# Patient Record
Sex: FEMALE | Race: WHITE | NOT HISPANIC OR LATINO | Employment: STUDENT | URBAN - NONMETROPOLITAN AREA
[De-identification: names, ages, dates, MRNs, and addresses within clinical notes are randomized per-mention and may not be internally consistent; named-entity substitution may affect disease eponyms.]

---
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
## 2024-01-11 ENCOUNTER — HOSPITAL ENCOUNTER (EMERGENCY)
Facility: HOSPITAL | Age: 19
Discharge: HOME | End: 2024-01-11
Attending: STUDENT IN AN ORGANIZED HEALTH CARE EDUCATION/TRAINING PROGRAM
Payer: COMMERCIAL

## 2024-01-11 VITALS
HEIGHT: 68 IN | RESPIRATION RATE: 20 BRPM | HEART RATE: 100 BPM | OXYGEN SATURATION: 98 % | WEIGHT: 210 LBS | BODY MASS INDEX: 31.83 KG/M2 | SYSTOLIC BLOOD PRESSURE: 139 MMHG | TEMPERATURE: 98.6 F | DIASTOLIC BLOOD PRESSURE: 86 MMHG

## 2024-01-11 DIAGNOSIS — H72.92 PERFORATION OF LEFT TYMPANIC MEMBRANE: Primary | ICD-10-CM

## 2024-01-11 LAB
FLUAV RNA RESP QL NAA+PROBE: NOT DETECTED
FLUBV RNA ISLT QL NAA+PROBE: NOT DETECTED
SARS-COV-2 RNA RESP QL NAA+PROBE: NOT DETECTED

## 2024-01-11 PROCEDURE — 25010000002 METHYLPREDNISOLONE PER 125 MG: Performed by: PHYSICIAN ASSISTANT

## 2024-01-11 PROCEDURE — 96372 THER/PROPH/DIAG INJ SC/IM: CPT

## 2024-01-11 PROCEDURE — 87636 SARSCOV2 & INF A&B AMP PRB: CPT | Performed by: PHYSICIAN ASSISTANT

## 2024-01-11 PROCEDURE — 99283 EMERGENCY DEPT VISIT LOW MDM: CPT

## 2024-01-11 PROCEDURE — 25010000002 CEFTRIAXONE PER 250 MG: Performed by: PHYSICIAN ASSISTANT

## 2024-01-11 RX ORDER — CIPROFLOXACIN AND DEXAMETHASONE 3; 1 MG/ML; MG/ML
4 SUSPENSION/ DROPS AURICULAR (OTIC) 2 TIMES DAILY
Status: DISCONTINUED | OUTPATIENT
Start: 2024-01-11 | End: 2024-01-11 | Stop reason: HOSPADM

## 2024-01-11 RX ORDER — METHYLPREDNISOLONE SODIUM SUCCINATE 125 MG/2ML
80 INJECTION, POWDER, LYOPHILIZED, FOR SOLUTION INTRAMUSCULAR; INTRAVENOUS ONCE
Status: COMPLETED | OUTPATIENT
Start: 2024-01-11 | End: 2024-01-11

## 2024-01-11 RX ORDER — HYDROCODONE BITARTRATE AND ACETAMINOPHEN 5; 325 MG/1; MG/1
1 TABLET ORAL ONCE
Status: COMPLETED | OUTPATIENT
Start: 2024-01-11 | End: 2024-01-11

## 2024-01-11 RX ORDER — CEFDINIR 300 MG/1
300 CAPSULE ORAL 2 TIMES DAILY
Qty: 10 CAPSULE | Refills: 0 | Status: SHIPPED | OUTPATIENT
Start: 2024-01-11

## 2024-01-11 RX ORDER — DIPHENHYDRAMINE HCL 25 MG
25 TABLET ORAL EVERY 6 HOURS PRN
Qty: 30 TABLET | Refills: 0 | Status: SHIPPED | OUTPATIENT
Start: 2024-01-11

## 2024-01-11 RX ORDER — METHYLPREDNISOLONE 4 MG/1
TABLET ORAL
Qty: 21 TABLET | Refills: 0 | Status: SHIPPED | OUTPATIENT
Start: 2024-01-11

## 2024-01-11 RX ADMIN — LIDOCAINE HYDROCHLORIDE 1 G: 10 INJECTION, SOLUTION EPIDURAL; INFILTRATION; INTRACAUDAL; PERINEURAL at 18:30

## 2024-01-11 RX ADMIN — HYDROCODONE BITARTRATE AND ACETAMINOPHEN 1 TABLET: 5; 325 TABLET ORAL at 18:30

## 2024-01-11 RX ADMIN — METHYLPREDNISOLONE SODIUM SUCCINATE 80 MG: 125 INJECTION, POWDER, FOR SOLUTION INTRAMUSCULAR; INTRAVENOUS at 18:30

## 2024-01-11 RX ADMIN — CIPROFLOXACIN AND DEXAMETHASONE 4 DROP: 3; 1 SUSPENSION/ DROPS AURICULAR (OTIC) at 18:27

## 2024-01-11 NOTE — ED PROVIDER NOTES
Subjective   History of Present Illness  18-year-old female with past medical history of allergies presents to the emergency room accompanied by her mother for bilateral ear pain which is worse on the left than on the right has been ongoing for several days.  Mother states patient was seen by primary care physician on Monday and placed on azithromycin for her ear infection, however states that she is only getting worse and not getting any better.  Patient also complains of laryngitis, congestion, and decreased hearing out of her left ear.  She denies fevers, chills, or bodyaches.  Denies any specific aggravating or alleviating factors despite the above-mentioned.  Denies any other complaints or concerns at this time.    History provided by:  Patient   used: No        Review of Systems   Constitutional: Negative.  Negative for fever.   HENT:  Positive for congestion and ear pain.    Respiratory: Negative.     Cardiovascular: Negative.  Negative for chest pain.   Gastrointestinal: Negative.  Negative for abdominal pain.   Endocrine: Negative.    Genitourinary: Negative.  Negative for dysuria.   Skin: Negative.    Neurological: Negative.    Psychiatric/Behavioral: Negative.     All other systems reviewed and are negative.      Past Medical History:   Diagnosis Date    Seasonal allergies        Allergies   Allergen Reactions    Flonase [Fluticasone]      Migraine headache     Keflex [Cephalexin] Rash     ? PCN    Penicillins Rash       Past Surgical History:   Procedure Laterality Date    TONSILLECTOMY AND ADENOIDECTOMY         Family History   Problem Relation Age of Onset    No Known Problems Mother     No Known Problems Father     No Known Problems Brother     No Known Problems Maternal Grandmother     No Known Problems Maternal Grandfather     No Known Problems Paternal Grandmother     No Known Problems Paternal Grandfather        Social History     Socioeconomic History    Marital status: Single    Tobacco Use    Smoking status: Never    Smokeless tobacco: Never   Vaping Use    Vaping Use: Never used   Substance and Sexual Activity    Alcohol use: No    Drug use: No    Sexual activity: Never           Objective   Physical Exam  Vitals and nursing note reviewed.   Constitutional:       General: She is not in acute distress.     Appearance: She is well-developed. She is not diaphoretic.      Comments: laryngitis   HENT:      Head: Normocephalic and atraumatic.      Right Ear: Hearing, tympanic membrane, ear canal and external ear normal.      Left Ear: External ear normal. Swelling present. Tympanic membrane is perforated.      Ears:      Comments: Left ear canal very erythematous     Nose: Nose normal. Congestion present.   Eyes:      Conjunctiva/sclera: Conjunctivae normal.      Pupils: Pupils are equal, round, and reactive to light.   Neck:      Vascular: No JVD.      Trachea: No tracheal deviation.   Cardiovascular:      Rate and Rhythm: Normal rate and regular rhythm.      Heart sounds: Normal heart sounds. No murmur heard.  Pulmonary:      Effort: Pulmonary effort is normal. No respiratory distress.      Breath sounds: Normal breath sounds. No wheezing.   Abdominal:      General: Bowel sounds are normal.      Palpations: Abdomen is soft.      Tenderness: There is no abdominal tenderness.   Musculoskeletal:         General: No deformity. Normal range of motion.      Cervical back: Normal range of motion and neck supple.   Skin:     General: Skin is warm and dry.      Coloration: Skin is not pale.      Findings: No erythema or rash.   Neurological:      Mental Status: She is alert and oriented to person, place, and time.      Cranial Nerves: No cranial nerve deficit.   Psychiatric:         Behavior: Behavior normal.         Thought Content: Thought content normal.         Procedures           ED Course  ED Course as of 01/11/24 1913   Thu Jan 11, 2024 1912 Pt tolerated IM Rocephin in the ED with  reaction. Will place on Omnicef for better ear coverage. Have sent order in for steroids and benadryl in the event patient started developing a rash or concerns for an allergic reaction and have advised that she stop taking the Omnicef should you begin reacting. Otherwise recommend that she follow up with ENT for TM rupture of left ear. [TK]      ED Course User Index  [TK] Dedra Ross, MERVAT                                   Results for orders placed or performed during the hospital encounter of 01/11/24   COVID-19 and FLU A/B PCR, 1 HR TAT - Swab, Nasopharynx    Specimen: Nasopharynx; Swab   Result Value Ref Range    COVID19 Not Detected Not Detected - Ref. Range    Influenza A PCR Not Detected Not Detected    Influenza B PCR Not Detected Not Detected                 Medical Decision Making  18-year-old female with past medical history of allergies presents to the emergency room accompanied by her mother for bilateral ear pain which is worse on the left than on the right has been ongoing for several days.  Mother states patient was seen by primary care physician on Monday and placed on azithromycin for her ear infection, however states that she is only getting worse and not getting any better.  Patient also complains of laryngitis, congestion, and decreased hearing out of her left ear.  She denies fevers, chills, or bodyaches.  Denies any specific aggravating or alleviating factors despite the above-mentioned.  Denies any other complaints or concerns at this time.      Amount and/or Complexity of Data Reviewed  Labs: ordered. Decision-making details documented in ED Course.    Risk  Prescription drug management.        Final diagnoses:   Perforation of left tympanic membrane       ED Disposition  ED Disposition       ED Disposition   Discharge    Condition   Stable    Comment   --               Ronal Horner MD  [address]  [facility] [address]  [facility] [address]  [phone]    In 2 days            Medication List        New Prescriptions      cefdinir 300 MG capsule  Commonly known as: OMNICEF  Take 1 capsule by mouth 2 (Two) Times a Day.     diphenhydrAMINE 25 MG tablet  Commonly known as: BENADRYL  Take 1 tablet by mouth Every 6 (Six) Hours As Needed for Itching or Allergies.     methylPREDNISolone 4 MG dose pack  Commonly known as: MEDROL  Take as directed on package instructions.               Where to Get Your Medications        These medications were sent to [facility][id number] - [address] - [phone] PH - [phone] [address] [address]      Phone: [phone]   cefdinir 300 MG capsule  diphenhydrAMINE 25 MG tablet  methylPREDNISolone 4 MG dose pack            Dedra Ross PA-C  01/11/24 7125
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
## 2024-06-17 ENCOUNTER — OFFICE VISIT (OUTPATIENT)
Dept: PSYCHIATRY | Facility: CLINIC | Age: 19
End: 2024-06-17
Payer: COMMERCIAL

## 2024-06-17 ENCOUNTER — LAB (OUTPATIENT)
Dept: LAB | Facility: HOSPITAL | Age: 19
End: 2024-06-17
Payer: COMMERCIAL

## 2024-06-17 VITALS
WEIGHT: 209.4 LBS | HEIGHT: 68 IN | OXYGEN SATURATION: 98 % | SYSTOLIC BLOOD PRESSURE: 142 MMHG | BODY MASS INDEX: 31.74 KG/M2 | DIASTOLIC BLOOD PRESSURE: 80 MMHG | HEART RATE: 78 BPM

## 2024-06-17 DIAGNOSIS — Z79.899 MEDICATION MANAGEMENT: ICD-10-CM

## 2024-06-17 DIAGNOSIS — F39 MOOD DISORDER: ICD-10-CM

## 2024-06-17 DIAGNOSIS — R41.840 POOR CONCENTRATION: ICD-10-CM

## 2024-06-17 DIAGNOSIS — F41.1 GENERALIZED ANXIETY DISORDER: ICD-10-CM

## 2024-06-17 DIAGNOSIS — R41.840 POOR CONCENTRATION: Primary | ICD-10-CM

## 2024-06-17 LAB
EXTERNAL AMPHETAMINE SCREEN URINE: NEGATIVE
EXTERNAL BENZODIAZEPINE SCREEN URINE: NEGATIVE
EXTERNAL BUPRENORPHINE SCREEN URINE: NEGATIVE
EXTERNAL COCAINE SCREEN URINE: NEGATIVE
EXTERNAL MDMA: NEGATIVE
EXTERNAL METHADONE SCREEN URINE: NEGATIVE
EXTERNAL METHAMPHETAMINE SCREEN URINE: NEGATIVE
EXTERNAL OPIATES SCREEN URINE: NEGATIVE
EXTERNAL OXYCODONE SCREEN URINE: NEGATIVE
EXTERNAL THC SCREEN URINE: NEGATIVE

## 2024-06-17 PROCEDURE — 90792 PSYCH DIAG EVAL W/MED SRVCS: CPT | Performed by: NURSE PRACTITIONER

## 2024-06-17 PROCEDURE — 84439 ASSAY OF FREE THYROXINE: CPT

## 2024-06-17 PROCEDURE — 1160F RVW MEDS BY RX/DR IN RCRD: CPT | Performed by: NURSE PRACTITIONER

## 2024-06-17 PROCEDURE — 80061 LIPID PANEL: CPT

## 2024-06-17 PROCEDURE — 36415 COLL VENOUS BLD VENIPUNCTURE: CPT

## 2024-06-17 PROCEDURE — 82607 VITAMIN B-12: CPT

## 2024-06-17 PROCEDURE — 84443 ASSAY THYROID STIM HORMONE: CPT

## 2024-06-17 PROCEDURE — 80053 COMPREHEN METABOLIC PANEL: CPT

## 2024-06-17 PROCEDURE — 85025 COMPLETE CBC W/AUTO DIFF WBC: CPT

## 2024-06-17 PROCEDURE — 1159F MED LIST DOCD IN RCRD: CPT | Performed by: NURSE PRACTITIONER

## 2024-06-17 RX ORDER — SERTRALINE HYDROCHLORIDE 25 MG/1
TABLET, FILM COATED ORAL
Qty: 60 TABLET | Refills: 0 | Status: SHIPPED | OUTPATIENT
Start: 2024-06-17

## 2024-06-18 ENCOUNTER — PATIENT ROUNDING (BHMG ONLY) (OUTPATIENT)
Dept: PSYCHIATRY | Facility: CLINIC | Age: 19
End: 2024-06-18
Payer: COMMERCIAL

## 2024-06-18 LAB
ALBUMIN SERPL-MCNC: 4.5 G/DL (ref 3.5–5.2)
ALBUMIN/GLOB SERPL: 1.9 G/DL
ALP SERPL-CCNC: 53 U/L (ref 39–117)
ALT SERPL W P-5'-P-CCNC: 27 U/L (ref 1–33)
ANION GAP SERPL CALCULATED.3IONS-SCNC: 12 MMOL/L (ref 5–15)
AST SERPL-CCNC: 21 U/L (ref 1–32)
BASOPHILS # BLD AUTO: 0.04 10*3/MM3 (ref 0–0.2)
BASOPHILS NFR BLD AUTO: 0.7 % (ref 0–1.5)
BILIRUB SERPL-MCNC: 1.2 MG/DL (ref 0–1.2)
BUN SERPL-MCNC: 11 MG/DL (ref 6–20)
BUN/CREAT SERPL: 12.8 (ref 7–25)
CALCIUM SPEC-SCNC: 9.2 MG/DL (ref 8.6–10.5)
CHLORIDE SERPL-SCNC: 103 MMOL/L (ref 98–107)
CHOLEST SERPL-MCNC: 195 MG/DL (ref 0–200)
CO2 SERPL-SCNC: 24 MMOL/L (ref 22–29)
CREAT SERPL-MCNC: 0.86 MG/DL (ref 0.57–1)
DEPRECATED RDW RBC AUTO: 38.9 FL (ref 37–54)
EGFRCR SERPLBLD CKD-EPI 2021: 98.7 ML/MIN/1.73
EOSINOPHIL # BLD AUTO: 0.16 10*3/MM3 (ref 0–0.4)
EOSINOPHIL NFR BLD AUTO: 2.8 % (ref 0.3–6.2)
ERYTHROCYTE [DISTWIDTH] IN BLOOD BY AUTOMATED COUNT: 12.1 % (ref 12.3–15.4)
GLOBULIN UR ELPH-MCNC: 2.4 GM/DL
GLUCOSE SERPL-MCNC: 89 MG/DL (ref 65–99)
HCT VFR BLD AUTO: 42.6 % (ref 34–46.6)
HDLC SERPL-MCNC: 40 MG/DL (ref 40–60)
HGB BLD-MCNC: 14.6 G/DL (ref 12–15.9)
IMM GRANULOCYTES # BLD AUTO: 0.02 10*3/MM3 (ref 0–0.05)
IMM GRANULOCYTES NFR BLD AUTO: 0.3 % (ref 0–0.5)
LDLC SERPL CALC-MCNC: 123 MG/DL (ref 0–100)
LDLC/HDLC SERPL: 2.98 {RATIO}
LYMPHOCYTES # BLD AUTO: 2.47 10*3/MM3 (ref 0.7–3.1)
LYMPHOCYTES NFR BLD AUTO: 43 % (ref 19.6–45.3)
MCH RBC QN AUTO: 30.2 PG (ref 26.6–33)
MCHC RBC AUTO-ENTMCNC: 34.3 G/DL (ref 31.5–35.7)
MCV RBC AUTO: 88 FL (ref 79–97)
MONOCYTES # BLD AUTO: 0.42 10*3/MM3 (ref 0.1–0.9)
MONOCYTES NFR BLD AUTO: 7.3 % (ref 5–12)
NEUTROPHILS NFR BLD AUTO: 2.63 10*3/MM3 (ref 1.7–7)
NEUTROPHILS NFR BLD AUTO: 45.9 % (ref 42.7–76)
NRBC BLD AUTO-RTO: 0 /100 WBC (ref 0–0.2)
PLATELET # BLD AUTO: 407 10*3/MM3 (ref 140–450)
PMV BLD AUTO: 9.8 FL (ref 6–12)
POTASSIUM SERPL-SCNC: 4.1 MMOL/L (ref 3.5–5.2)
PROT SERPL-MCNC: 6.9 G/DL (ref 6–8.5)
RBC # BLD AUTO: 4.84 10*6/MM3 (ref 3.77–5.28)
SODIUM SERPL-SCNC: 139 MMOL/L (ref 136–145)
T4 FREE SERPL-MCNC: 1.37 NG/DL (ref 0.92–1.68)
TRIGL SERPL-MCNC: 180 MG/DL (ref 0–150)
TSH SERPL DL<=0.05 MIU/L-ACNC: 1.31 UIU/ML (ref 0.27–4.2)
VIT B12 BLD-MCNC: 410 PG/ML (ref 211–946)
VLDLC SERPL-MCNC: 32 MG/DL (ref 5–40)
WBC NRBC COR # BLD AUTO: 5.74 10*3/MM3 (ref 3.4–10.8)

## 2024-06-18 NOTE — PROGRESS NOTES
June 18, 2024    Hello, may I speak with Nery Vegas?    My name is Mesha      I am  with [facility]  [address] [place] [address]  [place] [address] [phone].    Before we get started may I verify your date of birth? 2005    I am calling to officially welcome you to our practice and ask about your recent visit. Is this a good time to talk? yes    Tell me about your visit with us. What things went well?  everything       We're always looking for ways to make our patients' experiences even better. Do you have recommendations on ways we may improve?  no    Overall were you satisfied with your first visit to our practice? yes       I appreciate you taking the time to speak with me today. Is there anything else I can do for you? no      Thank you, and have a great day.

## 2024-07-15 NOTE — PROGRESS NOTES
"Subjective   Nery Vegas is a 19 y.o. female who presents today for follow up    Chief Complaint:  ADHD    History of Present Illness:   History of Present Illness  Today is a follow-up visit.     Medication compliance: patient not compliant with medication, has SE. States she got really \"angry\", she got angry at her dog, states she hasn't ever yelled at her dog.  Depression rated 6/10, with 10 being the worst.  Anxiety rated 7/10, with 10 being the worst.    Sleep is poor, getting about 5 hours a night,  Nightmares: Occasional  Appetite is binge eats, denies purging..  Hallucinations: Patient denies auditory hallucinations and visual hallucinations  Self-harm: Patient denies thoughts of self-harm.  Alcohol use: yes, socially  Drug use: no  Marijuana use: no     Chronic health issues, no acute physical or medical issues today.    Details: PCP, FRANC Willoughby in [place], [place], last seen a couple of months ago. She didn't tolerate Zoloft, states made her very angry, she stopped taking it after couple of weeks, her anger resolved.  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        The following portions of the patient's history were reviewed and updated as appropriate: allergies, current medications, past family history, past medical history, past social history, past surgical history and problem list.      Past Medical History:  Past Medical History:   Diagnosis Date    Seasonal allergies        Social History:  Social History     Socioeconomic History    Marital status: Single   Tobacco Use    Smoking status: Never    Smokeless tobacco: Never   Vaping Use    Vaping status: Every Day    Substances: Nicotine, Flavoring    Devices: Disposable   Substance and Sexual Activity    Alcohol use: No    Drug use: No    Sexual activity: Defer       Family History:  Family History   Problem Relation Age of Onset    No Known Problems Mother     No Known Problems Father     No Known Problems Brother     No Known Problems " "Maternal Grandmother     No Known Problems Maternal Grandfather     No Known Problems Paternal Grandmother     No Known Problems Paternal Grandfather        Past Surgical History:  Past Surgical History:   Procedure Laterality Date    TONSILLECTOMY AND ADENOIDECTOMY         Problem List:  There is no problem list on file for this patient.      Allergy:   Allergies   Allergen Reactions    Flonase [Fluticasone]      Migraine headache     Keflex [Cephalexin] Rash     ? PCN    Penicillins Rash        Current Medications:   Current Outpatient Medications   Medication Sig Dispense Refill    buPROPion SR (Wellbutrin SR) 100 MG 12 hr tablet Take 1 tablet every morning x 1 week, then increase to 1 tablet twice a day. 60 tablet 0    sertraline (Zoloft) 25 MG tablet Take 1 tablet every night x 7 nights, then increase 2 tablet at night (Patient not taking: Reported on 7/18/2024) 60 tablet 0     No current facility-administered medications for this visit.       Review of Symptoms:    Review of Systems   Neurological:  Negative for seizures.   Psychiatric/Behavioral:  Positive for decreased concentration, dysphoric mood, sleep disturbance, depressed mood and stress. Negative for hallucinations, self-injury, suicidal ideas and negative for hyperactivity. The patient is nervous/anxious.    All other systems reviewed and are negative.      Objective   Physical Exam:   Blood pressure 128/76, pulse 77, height 172.7 cm (67.99\"), weight 96.8 kg (213 lb 6.4 oz), SpO2 98%.  Body mass index is 32.46 kg/m².  Facility age limit for growth %lilly is 20 years.    7/18/24    MENTAL STATUS EXAM   General Appearance:  Cleanly groomed and dressed  Eye Contact:  Good eye contact  Attitude:  Cooperative  Motor Activity:  Normal gait, posture  Speech:  Normal rate, tone, volume  Language:  Spontaneous  Mood and affect:  Anxious and depressed  Hopelessness:  Denies  Thought Process:  Goal-directed and linear  Associations/ Thought Content:  No " delusions  Hallucinations:  None  Suicidal Ideations:  Not present  Homicidal Ideation:  Not present  Sensorium:  Alert  Orientation:  Person, place, time and situation  Insight:  Limited  Judgement:  Fair  Reliability:  Fair  Impulse Control:  Poor    PHQ-Score Total:  PHQ-9 Total Score: 17    Lab Results:   No visits with results within 1 Month(s) from this visit.   Latest known visit with results is:   Lab on 06/17/2024   Component Date Value Ref Range Status    Glucose 06/17/2024 89  65 - 99 mg/dL Final    BUN 06/17/2024 11  6 - 20 mg/dL Final    Creatinine 06/17/2024 0.86  0.57 - 1.00 mg/dL Final    Sodium 06/17/2024 139  136 - 145 mmol/L Final    Potassium 06/17/2024 4.1  3.5 - 5.2 mmol/L Final    Chloride 06/17/2024 103  98 - 107 mmol/L Final    CO2 06/17/2024 24.0  22.0 - 29.0 mmol/L Final    Calcium 06/17/2024 9.2  8.6 - 10.5 mg/dL Final    Total Protein 06/17/2024 6.9  6.0 - 8.5 g/dL Final    Albumin 06/17/2024 4.5  3.5 - 5.2 g/dL Final    ALT (SGPT) 06/17/2024 27  1 - 33 U/L Final    AST (SGOT) 06/17/2024 21  1 - 32 U/L Final    Alkaline Phosphatase 06/17/2024 53  39 - 117 U/L Final    Total Bilirubin 06/17/2024 1.2  0.0 - 1.2 mg/dL Final    Globulin 06/17/2024 2.4  gm/dL Final    A/G Ratio 06/17/2024 1.9  g/dL Final    BUN/Creatinine Ratio 06/17/2024 12.8  7.0 - 25.0 Final    Anion Gap 06/17/2024 12.0  5.0 - 15.0 mmol/L Final    eGFR 06/17/2024 98.7  >60.0 mL/min/1.73 Final    Total Cholesterol 06/17/2024 195  0 - 200 mg/dL Final    Triglycerides 06/17/2024 180 (H)  0 - 150 mg/dL Final    HDL Cholesterol 06/17/2024 40  40 - 60 mg/dL Final    LDL Cholesterol  06/17/2024 123 (H)  0 - 100 mg/dL Final    VLDL Cholesterol 06/17/2024 32  5 - 40 mg/dL Final    LDL/HDL Ratio 06/17/2024 2.98   Final    TSH 06/17/2024 1.310  0.270 - 4.200 uIU/mL Final    Free T4 06/17/2024 1.37  0.92 - 1.68 ng/dL Final    Vitamin B-12 06/17/2024 410  211 - 946 pg/mL Final    WBC 06/17/2024 5.74  3.40 - 10.80 10*3/mm3 Final    RBC  06/17/2024 4.84  3.77 - 5.28 10*6/mm3 Final    Hemoglobin 06/17/2024 14.6  12.0 - 15.9 g/dL Final    Hematocrit 06/17/2024 42.6  34.0 - 46.6 % Final    MCV 06/17/2024 88.0  79.0 - 97.0 fL Final    MCH 06/17/2024 30.2  26.6 - 33.0 pg Final    MCHC 06/17/2024 34.3  31.5 - 35.7 g/dL Final    RDW 06/17/2024 12.1 (L)  12.3 - 15.4 % Final    RDW-SD 06/17/2024 38.9  37.0 - 54.0 fl Final    MPV 06/17/2024 9.8  6.0 - 12.0 fL Final    Platelets 06/17/2024 407  140 - 450 10*3/mm3 Final    Neutrophil % 06/17/2024 45.9  42.7 - 76.0 % Final    Lymphocyte % 06/17/2024 43.0  19.6 - 45.3 % Final    Monocyte % 06/17/2024 7.3  5.0 - 12.0 % Final    Eosinophil % 06/17/2024 2.8  0.3 - 6.2 % Final    Basophil % 06/17/2024 0.7  0.0 - 1.5 % Final    Immature Grans % 06/17/2024 0.3  0.0 - 0.5 % Final    Neutrophils, Absolute 06/17/2024 2.63  1.70 - 7.00 10*3/mm3 Final    Lymphocytes, Absolute 06/17/2024 2.47  0.70 - 3.10 10*3/mm3 Final    Monocytes, Absolute 06/17/2024 0.42  0.10 - 0.90 10*3/mm3 Final    Eosinophils, Absolute 06/17/2024 0.16  0.00 - 0.40 10*3/mm3 Final    Basophils, Absolute 06/17/2024 0.04  0.00 - 0.20 10*3/mm3 Final    Immature Grans, Absolute 06/17/2024 0.02  0.00 - 0.05 10*3/mm3 Final    nRBC 06/17/2024 0.0  0.0 - 0.2 /100 WBC Final       Assessment & Plan   Problems Addressed this Visit    None  Visit Diagnoses       Poor concentration    -  Primary    Generalized anxiety disorder        Relevant Medications    buPROPion SR (Wellbutrin SR) 100 MG 12 hr tablet    Mood disorder        Relevant Medications    buPROPion SR (Wellbutrin SR) 100 MG 12 hr tablet    Medication management              Diagnoses         Codes Comments    Poor concentration    -  Primary ICD-10-CM: R41.840  ICD-9-CM: 799.51     Generalized anxiety disorder     ICD-10-CM: F41.1  ICD-9-CM: 300.02     Mood disorder     ICD-10-CM: F39  ICD-9-CM: 296.90     Medication management     ICD-10-CM: Z79.899  ICD-9-CM: V58.69             Social History      Tobacco Use   Smoking Status Never   Smokeless Tobacco Never       ANGELIKA reviewed and appropriate. Patient counseled on use of controlled substances.     -The benefits of a healthy diet and exercise were discussed with patient, especially the positive effects they have on mental health. Patient encouraged to consider lifestyle modification regarding  diet and exercise patterns to maximize results of mental health treatment.  -Reviewed previous available documentation  -Reviewed most recent available labs     -Patient was educated concerning Black Box Warning of increased suicidal thoughts and behaviors with SSRIs    -Previous psychiatric medications include prozac, clonidine, prazosin, effexor, strattera, zoloft    -Pt has no previous history of seizure or current eating disorder, which would be a contraindication for use. The possibility of activation with initiation was discussed and patient verbalizes understanding.    -Reviewed CPT 3 it indicated moderate indication of ADHD with some inattentiveness and strong impulsivity. .    Visit Diagnoses:    ICD-10-CM ICD-9-CM   1. Poor concentration  R41.840 799.51   2. Generalized anxiety disorder  F41.1 300.02   3. Mood disorder  F39 296.90   4. Medication management  Z79.899 V58.69       TREATMENT PLAN/GOALS: Continue supportive psychotherapy efforts and medications as indicated. Treatment and medication options discussed during today's visit. Patient acknowledged and verbally consented to continue with current treatment plan and was educated on the importance of compliance with treatment and follow-up appointments.    MEDICATION ISSUES:  Discussed medication options and treatment plan of prescribed medication as well as the risks, benefits, and side effects including potential falls, possible impaired driving and metabolic adversities among others. Patient is agreeable to call the office with any worsening of symptoms or onset of side effects. Patient is  agreeable to call 911 or go to the nearest ER should he/she begin having SI/HI.     MEDS ORDERED DURING VISIT:  New Medications Ordered This Visit   Medications    buPROPion SR (Wellbutrin SR) 100 MG 12 hr tablet     Sig: Take 1 tablet every morning x 1 week, then increase to 1 tablet twice a day.     Dispense:  60 tablet     Refill:  0       -Discontinue Zoloft  -Start bupropion  mg 12-hour tablet take 1 tablet every morning x 1 week then increase to 1 tablet twice a day for depression and anxiety    No follow-ups on file.         Prognosis: Guarded dependent on medication/follow up and treatment plan compliance.  Functionality: pt showing improvements in important areas of daily functioning.     Short-term goals: Patient will adhere to medication regimen and note continued improvement in symptoms over the next 3 months.   Long-term goals: Patient will be adherent to medication management and psychotherapy with continued improvement in symptoms over the next 6 months.    I spent 30 minutes caring for Nery on this date of service. This time includes time spent by me in the following activities: preparing for the visit, reviewing tests, performing a medically appropriate examination and/or evaluation, counseling and educating the patient/family/caregiver, documenting information in the medical record, and ordering medications            This document has been electronically signed by KYLE Kang   July 18, 2024 09:35 EDT    Part of this note may be an electronic transcription/translation of spoken language to printed text using the Dragon Dictation System.

## 2024-07-18 ENCOUNTER — OFFICE VISIT (OUTPATIENT)
Dept: PSYCHIATRY | Facility: CLINIC | Age: 19
End: 2024-07-18
Payer: COMMERCIAL

## 2024-07-18 VITALS
DIASTOLIC BLOOD PRESSURE: 76 MMHG | SYSTOLIC BLOOD PRESSURE: 128 MMHG | OXYGEN SATURATION: 98 % | HEART RATE: 77 BPM | BODY MASS INDEX: 32.34 KG/M2 | WEIGHT: 213.4 LBS | HEIGHT: 68 IN

## 2024-07-18 DIAGNOSIS — R41.840 POOR CONCENTRATION: Primary | ICD-10-CM

## 2024-07-18 DIAGNOSIS — Z79.899 MEDICATION MANAGEMENT: ICD-10-CM

## 2024-07-18 DIAGNOSIS — F41.1 GENERALIZED ANXIETY DISORDER: ICD-10-CM

## 2024-07-18 DIAGNOSIS — F39 MOOD DISORDER: ICD-10-CM

## 2024-07-18 RX ORDER — BUPROPION HYDROCHLORIDE 100 MG/1
TABLET, EXTENDED RELEASE ORAL
Qty: 60 TABLET | Refills: 0 | Status: SHIPPED | OUTPATIENT
Start: 2024-07-18

## 2024-08-12 NOTE — PROGRESS NOTES
Subjective   Nery Vegas is a 19 y.o. female who presents today for follow up    Chief Complaint:  ADHD    History of Present Illness:   History of Present Illness  Today is a follow-up visit.  Accompanied by Ward (boyfriend)    Medication compliance: patient is compliant with medications, denies SE.  Depression rated 1/10, with 10 being the worst.  Anxiety rated 8/10, with 10 being the worst.    Sleep is poor, getting about 5 hours a night,  Nightmares: Denies  Appetite is fair, binging less often, denies purging.  Hallucinations: Patient denies auditory hallucinations and visual hallucinations  Self-harm: Patient denies thoughts of self-harm.  Alcohol use: yes, socially   Drug use: no  Marijuana use: no     Chronic health issues, no acute physical or medical issues today.    Details: Things is better, less worrying, not as depressed, doesn't dwell on sad thoughts.  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        The following portions of the patient's history were reviewed and updated as appropriate: allergies, current medications, past family history, past medical history, past social history, past surgical history and problem list.      Past Medical History:  Past Medical History:   Diagnosis Date    Seasonal allergies        Social History:  Social History     Socioeconomic History    Marital status: Single   Tobacco Use    Smoking status: Never    Smokeless tobacco: Never   Vaping Use    Vaping status: Every Day    Substances: Nicotine, Flavoring    Devices: Disposable   Substance and Sexual Activity    Alcohol use: No    Drug use: No    Sexual activity: Defer       Family History:  Family History   Problem Relation Age of Onset    No Known Problems Mother     No Known Problems Father     No Known Problems Brother     No Known Problems Maternal Grandmother     No Known Problems Maternal Grandfather     No Known Problems Paternal Grandmother     No Known Problems Paternal  "Grandfather        Past Surgical History:  Past Surgical History:   Procedure Laterality Date    TONSILLECTOMY AND ADENOIDECTOMY         Problem List:  There is no problem list on file for this patient.      Allergy:   Allergies   Allergen Reactions    Flonase [Fluticasone]      Migraine headache     Keflex [Cephalexin] Rash     ? PCN    Penicillins Rash        Current Medications:   Current Outpatient Medications   Medication Sig Dispense Refill    buPROPion XL (Wellbutrin XL) 150 MG 24 hr tablet Take 1 tablet by mouth Every Morning. 30 tablet 1    busPIRone (BUSPAR) 5 MG tablet Take 1 tablet by mouth 3 (Three) Times a Day. 90 tablet 1    sertraline (Zoloft) 25 MG tablet Take 1 tablet every night x 7 nights, then increase 2 tablet at night (Patient not taking: Reported on 7/18/2024) 60 tablet 0     No current facility-administered medications for this visit.       Review of Symptoms:    Review of Systems   Neurological:  Negative for seizures.   Psychiatric/Behavioral:  Positive for decreased concentration, dysphoric mood, sleep disturbance, depressed mood and stress. Negative for hallucinations, self-injury and suicidal ideas. The patient is nervous/anxious.    All other systems reviewed and are negative.      Objective   Physical Exam:   Blood pressure 120/82, pulse 98, height 172.7 cm (67.99\"), weight 94.3 kg (207 lb 12.8 oz), SpO2 99%.  Body mass index is 31.6 kg/m².  Facility age limit for growth %lilly is 20 years.    08/15/24    MENTAL STATUS EXAM   General Appearance:  Cleanly groomed and dressed  Eye Contact:  Good eye contact  Attitude:  Cooperative  Motor Activity:  Normal gait, posture  Speech:  Normal rate, tone, volume  Language:  Spontaneous  Mood and affect:  Anxious and depressed  Hopelessness:  Denies  Thought Process:  Goal-directed and linear  Associations/ Thought Content:  No delusions  Hallucinations:  None  Suicidal Ideations:  Not present  Homicidal Ideation:  Not present  Sensorium:  " Alert  Orientation:  Person, place, time and situation  Insight:  Limited  Judgement:  Fair  Reliability:  Fair  Impulse Control:  Poor    PHQ-Score Total:  PHQ-9 Total Score: 6    Lab Results:   No visits with results within 1 Month(s) from this visit.   Latest known visit with results is:   Lab on 06/17/2024   Component Date Value Ref Range Status    Glucose 06/17/2024 89  65 - 99 mg/dL Final    BUN 06/17/2024 11  6 - 20 mg/dL Final    Creatinine 06/17/2024 0.86  0.57 - 1.00 mg/dL Final    Sodium 06/17/2024 139  136 - 145 mmol/L Final    Potassium 06/17/2024 4.1  3.5 - 5.2 mmol/L Final    Chloride 06/17/2024 103  98 - 107 mmol/L Final    CO2 06/17/2024 24.0  22.0 - 29.0 mmol/L Final    Calcium 06/17/2024 9.2  8.6 - 10.5 mg/dL Final    Total Protein 06/17/2024 6.9  6.0 - 8.5 g/dL Final    Albumin 06/17/2024 4.5  3.5 - 5.2 g/dL Final    ALT (SGPT) 06/17/2024 27  1 - 33 U/L Final    AST (SGOT) 06/17/2024 21  1 - 32 U/L Final    Alkaline Phosphatase 06/17/2024 53  39 - 117 U/L Final    Total Bilirubin 06/17/2024 1.2  0.0 - 1.2 mg/dL Final    Globulin 06/17/2024 2.4  gm/dL Final    A/G Ratio 06/17/2024 1.9  g/dL Final    BUN/Creatinine Ratio 06/17/2024 12.8  7.0 - 25.0 Final    Anion Gap 06/17/2024 12.0  5.0 - 15.0 mmol/L Final    eGFR 06/17/2024 98.7  >60.0 mL/min/1.73 Final    Total Cholesterol 06/17/2024 195  0 - 200 mg/dL Final    Triglycerides 06/17/2024 180 (H)  0 - 150 mg/dL Final    HDL Cholesterol 06/17/2024 40  40 - 60 mg/dL Final    LDL Cholesterol  06/17/2024 123 (H)  0 - 100 mg/dL Final    VLDL Cholesterol 06/17/2024 32  5 - 40 mg/dL Final    LDL/HDL Ratio 06/17/2024 2.98   Final    TSH 06/17/2024 1.310  0.270 - 4.200 uIU/mL Final    Free T4 06/17/2024 1.37  0.92 - 1.68 ng/dL Final    Vitamin B-12 06/17/2024 410  211 - 946 pg/mL Final    WBC 06/17/2024 5.74  3.40 - 10.80 10*3/mm3 Final    RBC 06/17/2024 4.84  3.77 - 5.28 10*6/mm3 Final    Hemoglobin 06/17/2024 14.6  12.0 - 15.9 g/dL Final    Hematocrit  06/17/2024 42.6  34.0 - 46.6 % Final    MCV 06/17/2024 88.0  79.0 - 97.0 fL Final    MCH 06/17/2024 30.2  26.6 - 33.0 pg Final    MCHC 06/17/2024 34.3  31.5 - 35.7 g/dL Final    RDW 06/17/2024 12.1 (L)  12.3 - 15.4 % Final    RDW-SD 06/17/2024 38.9  37.0 - 54.0 fl Final    MPV 06/17/2024 9.8  6.0 - 12.0 fL Final    Platelets 06/17/2024 407  140 - 450 10*3/mm3 Final    Neutrophil % 06/17/2024 45.9  42.7 - 76.0 % Final    Lymphocyte % 06/17/2024 43.0  19.6 - 45.3 % Final    Monocyte % 06/17/2024 7.3  5.0 - 12.0 % Final    Eosinophil % 06/17/2024 2.8  0.3 - 6.2 % Final    Basophil % 06/17/2024 0.7  0.0 - 1.5 % Final    Immature Grans % 06/17/2024 0.3  0.0 - 0.5 % Final    Neutrophils, Absolute 06/17/2024 2.63  1.70 - 7.00 10*3/mm3 Final    Lymphocytes, Absolute 06/17/2024 2.47  0.70 - 3.10 10*3/mm3 Final    Monocytes, Absolute 06/17/2024 0.42  0.10 - 0.90 10*3/mm3 Final    Eosinophils, Absolute 06/17/2024 0.16  0.00 - 0.40 10*3/mm3 Final    Basophils, Absolute 06/17/2024 0.04  0.00 - 0.20 10*3/mm3 Final    Immature Grans, Absolute 06/17/2024 0.02  0.00 - 0.05 10*3/mm3 Final    nRBC 06/17/2024 0.0  0.0 - 0.2 /100 WBC Final       Assessment & Plan   Problems Addressed this Visit    None  Visit Diagnoses       Generalized anxiety disorder    -  Primary    Relevant Medications    buPROPion XL (Wellbutrin XL) 150 MG 24 hr tablet    busPIRone (BUSPAR) 5 MG tablet    Poor concentration        Relevant Medications    buPROPion XL (Wellbutrin XL) 150 MG 24 hr tablet    busPIRone (BUSPAR) 5 MG tablet    Mood disorder        Relevant Medications    buPROPion XL (Wellbutrin XL) 150 MG 24 hr tablet    busPIRone (BUSPAR) 5 MG tablet    Medication management        Relevant Medications    buPROPion XL (Wellbutrin XL) 150 MG 24 hr tablet    busPIRone (BUSPAR) 5 MG tablet          Diagnoses         Codes Comments    Generalized anxiety disorder    -  Primary ICD-10-CM: F41.1  ICD-9-CM: 300.02     Poor concentration     ICD-10-CM:  R41.840  ICD-9-CM: 799.51     Mood disorder     ICD-10-CM: F39  ICD-9-CM: 296.90     Medication management     ICD-10-CM: Z79.899  ICD-9-CM: V58.69             Social History     Tobacco Use   Smoking Status Never   Smokeless Tobacco Never       ANGELIKA reviewed and appropriate. Patient counseled on use of controlled substances.     -The benefits of a healthy diet and exercise were discussed with patient, especially the positive effects they have on mental health. Patient encouraged to consider lifestyle modification regarding  diet and exercise patterns to maximize results of mental health treatment.  -Reviewed previous available documentation  -Reviewed most recent available labs     -Patient was educated concerning Black Box Warning of increased suicidal thoughts and behaviors with SSRIs    -Pt has no previous history of seizure or current eating disorder, which would be a contraindication for use. The possibility of activation with initiation was discussed and patient verbalizes understanding.    -Previous psychiatric medications include prozac, clonidine, prazosin, effexor, strattera, zoloft, wellbutrin      Visit Diagnoses:    ICD-10-CM ICD-9-CM   1. Generalized anxiety disorder  F41.1 300.02   2. Poor concentration  R41.840 799.51   3. Mood disorder  F39 296.90   4. Medication management  Z79.899 V58.69         TREATMENT PLAN/GOALS: Continue supportive psychotherapy efforts and medications as indicated. Treatment and medication options discussed during today's visit. Patient acknowledged and verbally consented to continue with current treatment plan and was educated on the importance of compliance with treatment and follow-up appointments.    MEDICATION ISSUES:  Discussed medication options and treatment plan of prescribed medication as well as the risks, benefits, and side effects including potential falls, possible impaired driving and metabolic adversities among others. Patient is agreeable to call the  office with any worsening of symptoms or onset of side effects. Patient is agreeable to call 911 or go to the nearest ER should he/she begin having SI/HI.     MEDS ORDERED DURING VISIT:  New Medications Ordered This Visit   Medications    buPROPion XL (Wellbutrin XL) 150 MG 24 hr tablet     Sig: Take 1 tablet by mouth Every Morning.     Dispense:  30 tablet     Refill:  1    busPIRone (BUSPAR) 5 MG tablet     Sig: Take 1 tablet by mouth 3 (Three) Times a Day.     Dispense:  90 tablet     Refill:  1     -D/C bupropion SR  -Start Wellbutrin  mg tablet, every morning for depression  -Start Buspar 5 mg tablet take 1 tablet by mouth 3 times a day for anxiety  Return in about 2 months (around 10/15/2024).         Prognosis: Guarded dependent on medication/follow up and treatment plan compliance.  Functionality: pt showing improvements in important areas of daily functioning.     Short-term goals: Patient will adhere to medication regimen and note continued improvement in symptoms over the next 3 months.   Long-term goals: Patient will be adherent to medication management and psychotherapy with continued improvement in symptoms over the next 6 months.    I spent 30 minutes caring for Nery on this date of service. This time includes time spent by me in the following activities: preparing for the visit, performing a medically appropriate examination and/or evaluation, counseling and educating the patient/family/caregiver, documenting information in the medical record, and ordering medications              This document has been electronically signed by KYLE Kang   August 15, 2024 14:35 EDT    Part of this note may be an electronic transcription/translation of spoken language to printed text using the Dragon Dictation System.

## 2024-08-15 ENCOUNTER — OFFICE VISIT (OUTPATIENT)
Dept: PSYCHIATRY | Facility: CLINIC | Age: 19
End: 2024-08-15
Payer: COMMERCIAL

## 2024-08-15 VITALS
OXYGEN SATURATION: 99 % | DIASTOLIC BLOOD PRESSURE: 82 MMHG | SYSTOLIC BLOOD PRESSURE: 120 MMHG | BODY MASS INDEX: 31.49 KG/M2 | HEART RATE: 98 BPM | WEIGHT: 207.8 LBS | HEIGHT: 68 IN

## 2024-08-15 DIAGNOSIS — F41.1 GENERALIZED ANXIETY DISORDER: Primary | ICD-10-CM

## 2024-08-15 DIAGNOSIS — F39 MOOD DISORDER: ICD-10-CM

## 2024-08-15 DIAGNOSIS — R41.840 POOR CONCENTRATION: ICD-10-CM

## 2024-08-15 DIAGNOSIS — Z79.899 MEDICATION MANAGEMENT: ICD-10-CM

## 2024-08-15 RX ORDER — BUPROPION HYDROCHLORIDE 150 MG/1
150 TABLET ORAL EVERY MORNING
Qty: 30 TABLET | Refills: 1 | Status: SHIPPED | OUTPATIENT
Start: 2024-08-15 | End: 2025-08-15

## 2024-08-15 RX ORDER — BUSPIRONE HYDROCHLORIDE 5 MG/1
5 TABLET ORAL 3 TIMES DAILY
Qty: 90 TABLET | Refills: 1 | Status: SHIPPED | OUTPATIENT
Start: 2024-08-15